# Patient Record
Sex: MALE | Race: WHITE | NOT HISPANIC OR LATINO | Employment: FULL TIME | URBAN - METROPOLITAN AREA
[De-identification: names, ages, dates, MRNs, and addresses within clinical notes are randomized per-mention and may not be internally consistent; named-entity substitution may affect disease eponyms.]

---

## 2021-11-13 ENCOUNTER — OFFICE VISIT (OUTPATIENT)
Dept: URGENT CARE | Facility: MEDICAL CENTER | Age: 36
End: 2021-11-13
Payer: COMMERCIAL

## 2021-11-13 VITALS
SYSTOLIC BLOOD PRESSURE: 145 MMHG | DIASTOLIC BLOOD PRESSURE: 83 MMHG | TEMPERATURE: 99.4 F | RESPIRATION RATE: 18 BRPM | HEIGHT: 72 IN | WEIGHT: 244 LBS | HEART RATE: 113 BPM | BODY MASS INDEX: 33.05 KG/M2 | OXYGEN SATURATION: 95 %

## 2021-11-13 DIAGNOSIS — B34.9 ACUTE VIRAL SYNDROME: Primary | ICD-10-CM

## 2021-11-13 PROCEDURE — 99213 OFFICE O/P EST LOW 20 MIN: CPT | Performed by: PHYSICIAN ASSISTANT

## 2021-11-13 PROCEDURE — 0241U HB NFCT DS VIR RESP RNA 4 TRGT: CPT | Performed by: PHYSICIAN ASSISTANT

## 2021-11-14 LAB
FLUAV RNA RESP QL NAA+PROBE: NEGATIVE
FLUBV RNA RESP QL NAA+PROBE: NEGATIVE
RSV RNA RESP QL NAA+PROBE: NEGATIVE
SARS-COV-2 RNA RESP QL NAA+PROBE: NEGATIVE

## 2023-06-22 PROBLEM — H92.02 LEFT EAR PAIN: Status: ACTIVE | Noted: 2023-06-22

## 2023-11-05 ENCOUNTER — OFFICE VISIT (OUTPATIENT)
Dept: URGENT CARE | Facility: MEDICAL CENTER | Age: 38
End: 2023-11-05
Payer: COMMERCIAL

## 2023-11-05 VITALS
DIASTOLIC BLOOD PRESSURE: 82 MMHG | OXYGEN SATURATION: 98 % | BODY MASS INDEX: 34.46 KG/M2 | HEART RATE: 88 BPM | HEIGHT: 72 IN | SYSTOLIC BLOOD PRESSURE: 135 MMHG | TEMPERATURE: 98.6 F | WEIGHT: 254.4 LBS | RESPIRATION RATE: 18 BRPM

## 2023-11-05 DIAGNOSIS — S60.942A SUPERFICIAL INJURY OF RIGHT MIDDLE FINGER WITH INFECTION: Primary | ICD-10-CM

## 2023-11-05 DIAGNOSIS — L08.9 SUPERFICIAL INJURY OF RIGHT MIDDLE FINGER WITH INFECTION: Primary | ICD-10-CM

## 2023-11-05 PROCEDURE — 99213 OFFICE O/P EST LOW 20 MIN: CPT | Performed by: PHYSICIAN ASSISTANT

## 2023-11-05 RX ORDER — CEPHALEXIN 500 MG/1
500 CAPSULE ORAL EVERY 6 HOURS SCHEDULED
Qty: 28 CAPSULE | Refills: 0 | Status: SHIPPED | OUTPATIENT
Start: 2023-11-05 | End: 2023-11-12

## 2023-11-05 NOTE — PROGRESS NOTES
[facility]        NAME: Ernesto Masters is a 34 y.o. male  : 1985    MRN: [id number]  DATE: 2023  TIME: 6:40 PM    Assessment and Plan   Superficial injury of right middle finger with infection [S60.942A, L08.9]  1. Superficial injury of right middle finger with infection  cephalexin (KEFLEX) 500 mg capsule            Patient Instructions       Follow up with PCP in 3-5 days. Proceed to  ER if symptoms worsen. Chief Complaint     Chief Complaint   Patient presents with    Finger Injury     Right middle finger small lac from work truck on Friday morning; states he's been keeping clean and soaking, wore a bandage while doing yard work today and noticed its red and swollen         History of Present Illness       Patient is here for evaluation of redness and swelling to his middle finger. Patient got a small cut on his finger on Friday. He has been keeping it clean and bandaged while at work. He noticed that it is getting more red and swollen today. Review of Systems   Review of Systems   Constitutional: Negative. Musculoskeletal: Negative. Skin:  Positive for color change and wound. Negative for pallor and rash. Current Medications       Current Outpatient Medications:     cephalexin (KEFLEX) 500 mg capsule, Take 1 capsule (500 mg total) by mouth every 6 (six) hours for 7 days, Disp: 28 capsule, Rfl: 0    Current Allergies     Allergies as of 2023 - Reviewed 2023   Allergen Reaction Noted    Oxycodone Other (See Comments) 2021            The following portions of the patient's history were reviewed and updated as appropriate: allergies, current medications, past family history, past medical history, past social history, past surgical history and problem list.     History reviewed. No pertinent past medical history. History reviewed. No pertinent surgical history. History reviewed. No pertinent family history.       Medications have been verified. Objective   /82   Pulse 88   Temp 98.6 °F (37 °C) (Temporal)   Resp 18   Ht 6' (1.829 m)   Wt 115 kg (254 lb 6.4 oz)   SpO2 98%   BMI 34.50 kg/m²   No LMP for male patient. Physical Exam     Physical Exam  Vitals and nursing note reviewed. Constitutional:       General: He is not in acute distress. Appearance: Normal appearance. He is well-developed. He is not ill-appearing, toxic-appearing or diaphoretic. HENT:      Head: Normocephalic and atraumatic. Eyes:      Extraocular Movements: Extraocular movements intact. Conjunctiva/sclera: Conjunctivae normal.      Pupils: Pupils are equal, round, and reactive to light. Skin:     General: Skin is warm and dry. Comments: Small healing wound on the right distal middle finger. There is focal surrounding erythema and swelling and warmth to the area. Range of motion of the right middle finger intact but slightly limited due to the swelling. No purulent drainage currently   Neurological:      General: No focal deficit present. Mental Status: He is alert and oriented to person, place, and time. Psychiatric:         Mood and Affect: Mood normal.         Behavior: Behavior normal.         Thought Content: Thought content normal.         Judgment: Judgment normal.       Patient's most recent tetanus was in 2018.

## 2023-11-05 NOTE — PATIENT INSTRUCTIONS
1.  Continue warm water and Epsom salt soaks frequently    2. Take probiotics [i.e. Yogurt, Acidophilus, Florastor (liquid)] daily. 3.  Over-the-counter medications as needed for symptomatic care. 4.   Advance activities as tolerated. 5.   Follow-up with your primary care physician in 3-4 days if symptoms persist.    6.  Go to emergency room if symptoms are worsening.     7.  Wound care daily as directed